# Patient Record
Sex: FEMALE | Race: AMERICAN INDIAN OR ALASKA NATIVE | NOT HISPANIC OR LATINO | Employment: FULL TIME | URBAN - NONMETROPOLITAN AREA
[De-identification: names, ages, dates, MRNs, and addresses within clinical notes are randomized per-mention and may not be internally consistent; named-entity substitution may affect disease eponyms.]

---

## 2017-01-05 ENCOUNTER — TRANSFERRED RECORDS (OUTPATIENT)
Dept: HEALTH INFORMATION MANAGEMENT | Facility: HOSPITAL | Age: 72
End: 2017-01-05

## 2017-01-11 ENCOUNTER — ANESTHESIA EVENT (OUTPATIENT)
Dept: SURGERY | Facility: HOSPITAL | Age: 72
End: 2017-01-11
Payer: COMMERCIAL

## 2017-01-11 ENCOUNTER — ANESTHESIA (OUTPATIENT)
Dept: SURGERY | Facility: HOSPITAL | Age: 72
End: 2017-01-11
Payer: COMMERCIAL

## 2017-01-11 PROCEDURE — 25000125 ZZHC RX 250: Performed by: NURSE ANESTHETIST, CERTIFIED REGISTERED

## 2017-01-11 PROCEDURE — 43239 EGD BIOPSY SINGLE/MULTIPLE: CPT | Performed by: ANESTHESIOLOGY

## 2017-01-11 PROCEDURE — 01999 UNLISTED ANES PROCEDURE: CPT | Performed by: NURSE ANESTHETIST, CERTIFIED REGISTERED

## 2017-01-11 RX ORDER — FENTANYL CITRATE 50 UG/ML
INJECTION, SOLUTION INTRAMUSCULAR; INTRAVENOUS PRN
Status: DISCONTINUED | OUTPATIENT
Start: 2017-01-11 | End: 2017-01-11

## 2017-01-11 RX ORDER — PROPOFOL 10 MG/ML
INJECTION, EMULSION INTRAVENOUS PRN
Status: DISCONTINUED | OUTPATIENT
Start: 2017-01-11 | End: 2017-01-11

## 2017-01-11 RX ADMIN — PROPOFOL 10 MG: 10 INJECTION, EMULSION INTRAVENOUS at 07:48

## 2017-01-11 RX ADMIN — PROPOFOL 20 MG: 10 INJECTION, EMULSION INTRAVENOUS at 07:38

## 2017-01-11 RX ADMIN — PROPOFOL 10 MG: 10 INJECTION, EMULSION INTRAVENOUS at 07:44

## 2017-01-11 RX ADMIN — PROPOFOL 10 MG: 10 INJECTION, EMULSION INTRAVENOUS at 07:46

## 2017-01-11 RX ADMIN — PROPOFOL 20 MG: 10 INJECTION, EMULSION INTRAVENOUS at 07:40

## 2017-01-11 RX ADMIN — PROPOFOL 20 MG: 10 INJECTION, EMULSION INTRAVENOUS at 07:35

## 2017-01-11 RX ADMIN — MIDAZOLAM HYDROCHLORIDE 1 MG: 1 INJECTION, SOLUTION INTRAMUSCULAR; INTRAVENOUS at 07:30

## 2017-01-11 RX ADMIN — PROPOFOL 10 MG: 10 INJECTION, EMULSION INTRAVENOUS at 07:42

## 2017-01-11 RX ADMIN — FENTANYL CITRATE 50 MCG: 50 INJECTION, SOLUTION INTRAMUSCULAR; INTRAVENOUS at 07:30

## 2017-01-11 ASSESSMENT — COPD QUESTIONNAIRES
COPD: 1
CAT_SEVERITY: MODERATE

## 2017-01-11 ASSESSMENT — ENCOUNTER SYMPTOMS: DYSRHYTHMIAS: 1

## 2017-01-11 ASSESSMENT — LIFESTYLE VARIABLES: TOBACCO_USE: 1

## 2017-01-11 NOTE — ANESTHESIA POSTPROCEDURE EVALUATION
Patient: Zoey Robledo    COMBINED ESOPHAGOSCOPY, GASTROSCOPY, DUODENOSCOPY (EGD) (N/A Esophagus)  Additional InformationProcedure(s):  UPPER ENDOSCOPY WITH BIOPSIES AND POLYPECTOMY - Wound Class: II-Clean Contaminated    Diagnosis:Melena  Diagnosis Additional Information: No value filed.    Anesthesia Type:  MAC    Note:  Anesthesia Post Evaluation    Patient location during evaluation: Phase 2 and Bedside  Patient participation: Able to fully participate in evaluation  Level of consciousness: awake and alert  Pain management: adequate  Airway patency: patent  Cardiovascular status: acceptable  Respiratory status: acceptable  Hydration status: stable  PONV: none     Anesthetic complications: None          Last vitals:  Filed Vitals:    01/11/17 0820 01/11/17 0825 01/11/17 0830   BP: 124/62 135/66 134/65   Temp:      Resp: 16  16   SpO2: 92% 93% 94%       Electronically Signed By: Cesar Moore MD  January 11, 2017  8:34 AM

## 2017-01-11 NOTE — ANESTHESIA PREPROCEDURE EVALUATION
Anesthesia Evaluation     . Pt has had prior anesthetic.     No history of anesthetic complications     ROS/MED HX    ENT/Pulmonary:     (+)tobacco use, Current use 1.0 PPD packs/day  moderate COPD, , . Other pulmonary disease Per recent CXR: 'pleural parenchymal scarring or fibrosis .    Neurologic:     (+)migraines,     Cardiovascular:     (+) Dyslipidemia, hypertension-range: not on beta blocker, ---. : . . JASSO, . :. dysrhythmias Irregular Heartbeat/Palpitations, . Previous cardiac testing date:results:date: results:ECG reviewed date:12/1/2016 results:NSR@77, OWN date: results:          METS/Exercise Tolerance:     Hematologic:  - neg hematologic  ROS       Musculoskeletal:   (+) arthritis, , , other musculoskeletal- Chronic LBP      GI/Hepatic:     (+) GERD       Renal/Genitourinary:  - ROS Renal section negative       Endo:     (+) type II DM .      Psychiatric:  - neg psychiatric ROS       Infectious Disease:  - neg infectious disease ROS       Malignancy:      - no malignancy   Other:    - neg other ROS           Physical Exam  Normal systems: cardiovascular and dental    Airway   Mallampati: III  TM distance: <3 FB  Neck ROM: full    Dental     Cardiovascular   Rhythm and rate: regular and normal      Pulmonary (+) wheezes     PE comment: End-Expiratory wheezing; unchanged from preop H&P                    Anesthesia Plan      History & Physical Review  History and physical reviewed and following examination; no interval change.    ASA Status:  3 .        Plan for MAC with Intravenous and Propofol induction. Maintenance will be TIVA.  Reason for MAC:  Chronic cardiopulmonary disease (G9) and Other - see comments  PONV prophylaxis:  Ondansetron (or other 5HT-3)  Surgeon requests deep sedation. Patient is an ASA 3. Will provide MAC.      Postoperative Care  Postoperative pain management:  IV analgesics.      Consents  Anesthetic plan, risks, benefits and alternatives discussed with:  Patient..                           .

## 2017-01-11 NOTE — ANESTHESIA CARE TRANSFER NOTE
Patient: Zoey Robledo    COMBINED ESOPHAGOSCOPY, GASTROSCOPY, DUODENOSCOPY (EGD) (N/A Esophagus)  Additional InformationProcedure(s):  UPPER ENDOSCOPY WITH BIOPSIES - Wound Class: II-Clean Contaminated    Diagnosis: Melena  Diagnosis Additional Information: No value filed.    Anesthesia Type:   MAC     Note:  Airway :Nasal Cannula  Patient transferred to:Phase II        Vitals: (Last set prior to Anesthesia Care Transfer)              Electronically Signed By: OCTAVIA Middleton CRNA  January 11, 2017  7:57 AM

## 2022-09-26 ENCOUNTER — MEDICAL CORRESPONDENCE (OUTPATIENT)
Dept: NUCLEAR MEDICINE | Facility: HOSPITAL | Age: 77
End: 2022-09-26

## 2022-10-31 ENCOUNTER — HOSPITAL ENCOUNTER (OUTPATIENT)
Dept: CARDIOLOGY | Facility: HOSPITAL | Age: 77
Setting detail: NUCLEAR MEDICINE
Discharge: HOME | End: 2022-10-31
Attending: FAMILY MEDICINE
Payer: COMMERCIAL

## 2022-10-31 ENCOUNTER — HOSPITAL ENCOUNTER (OUTPATIENT)
Dept: NUCLEAR MEDICINE | Facility: HOSPITAL | Age: 77
Setting detail: NUCLEAR MEDICINE
Discharge: HOME | End: 2022-10-31
Attending: FAMILY MEDICINE
Payer: COMMERCIAL

## 2022-10-31 DIAGNOSIS — I21.4 NSTEMI (NON-ST ELEVATED MYOCARDIAL INFARCTION) (H): ICD-10-CM

## 2022-10-31 LAB
CV BLOOD PRESSURE: 77 MMHG
CV STRESS MAX HR HE: 106
NUC STRESS EJECTION FRACTION: 65 %
RATE PRESSURE PRODUCT: NORMAL
STRESS ECHO BASELINE DIASTOLIC HE: 58
STRESS ECHO BASELINE HR: 73 BPM
STRESS ECHO BASELINE SYSTOLIC BP: 124
STRESS ECHO CALCULATED PERCENT HR: 73 %
STRESS ECHO LAST STRESS DIASTOLIC BP: 58
STRESS ECHO LAST STRESS SYSTOLIC BP: 122
STRESS ECHO TARGET HR: 145

## 2022-10-31 PROCEDURE — 93018 CV STRESS TEST I&R ONLY: CPT | Performed by: INTERNAL MEDICINE

## 2022-10-31 PROCEDURE — 93017 CV STRESS TEST TRACING ONLY: CPT

## 2022-10-31 PROCEDURE — 250N000011 HC RX IP 250 OP 636: Performed by: INTERNAL MEDICINE

## 2022-10-31 PROCEDURE — 78452 HT MUSCLE IMAGE SPECT MULT: CPT

## 2022-10-31 PROCEDURE — A9500 TC99M SESTAMIBI: HCPCS | Performed by: RADIOLOGY

## 2022-10-31 PROCEDURE — 343N000001 HC RX 343: Performed by: RADIOLOGY

## 2022-10-31 PROCEDURE — 93016 CV STRESS TEST SUPVJ ONLY: CPT | Performed by: INTERNAL MEDICINE

## 2022-10-31 RX ORDER — REGADENOSON 0.08 MG/ML
0.4 INJECTION, SOLUTION INTRAVENOUS ONCE
Status: COMPLETED | OUTPATIENT
Start: 2022-10-31 | End: 2022-10-31

## 2022-10-31 RX ORDER — AMINOPHYLLINE 25 MG/ML
INJECTION, SOLUTION INTRAVENOUS
Status: DISCONTINUED
Start: 2022-10-31 | End: 2022-10-31 | Stop reason: WASHOUT

## 2022-10-31 RX ADMIN — Medication 31.3 MILLICURIE: at 08:49

## 2022-10-31 RX ADMIN — REGADENOSON 0.4 MG: 0.08 INJECTION, SOLUTION INTRAVENOUS at 08:49

## 2022-10-31 RX ADMIN — Medication 9.5 MILLICURIE: at 06:47

## 2024-04-23 NOTE — PROGRESS NOTES
Otolaryngology Consultation    Patient: Zoey Robledo  : 1945    Patient presents with:  Ear Problem: Dysfunction of right eustachian tube- Geovani Walker CNP referring Ele Carver      HPI:  Zoey Robledo is a 78 year old female seen today for Eustachian tube dysfunction.  She also has a history of chronic recurrent sinusitis.    She had a month of right otalgia which recently resolved.  She saw Geovani Walker and was diagnosed with right eustachian tube dysfunction.  She was started on antihistamines and Flonase and supportive care recommended and referred to ENT.      She denies a history of chronic otitis media or recurrent acute otitis media.  No prior otologic surgery    She denies a history of otorrhea, vertigo or bothersome tinnitus.      History of migraine with aura.  She has an occasional aura followed by mild headache which self resolves.    She continues to have right aural fullness  Slow decline in hearing bilaterally.  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      Right otalgia seemed worse with head movement    Audiogram:  none in epic    She also notes a history of chronic recurrent sinusitis throughout her adult life.  She has 3-4 episodes of acute sinusitis a year associated with purulent rhinorrhea worsening cough and underlying chronic bilateral nasal obstruction.    SNOT 36 with moderate need to blow nose and postnasal drainage    No prior CT sinus      History of migraine, type 2 diabetes.    Pulm hx:  Followed by pulmonology.  2024 note with Dr. Yoder reviewed.  52-pack-year history of tobacco abuse quit in 2019 with severe COPD.  She has chronic chest congestion and sputum and has tried Mucinex in the past.  Recent decline in her PFT.  She was started on nasal saline by pulmonology.  She was to  an Acapella device to mobilize her secretions.  She takes Trelegy, albuterol and nebulizers, Singulair and Flonase    Former smoker, quit  1992    Sino-Nasal Outcome Test (SNOT - 22)    1. Need to Blow Nose: Moderate  2. Nasal Blockage: Mild or slight  3. Sneezing: Mild or slight  4. Runny Nose: Mild or slight  5. Cough: Mild or slight  6. Post-nasal discharge: Moderate  7. Thick nasal discharge: None  8. Ear fullness: Mild or slight  9. Dizziness: Very mild  10. Ear Pain: Mild or slight  11. Facial pain/pressure: Very mild  12. Decreased Sense of Smell/Taste: Very mild  13. Difficulty falling asleep: None  14. Wake up at night: Mild or slight  15. Lack of a good night's sleep: Moderate  16. Wake up tired: Mild or slight  17. Fatigue: Mild or slight  18. Reduced Productivity: Mild or slight  19. Reduced Concentration: Very mild  20. Frustrated/restless/irritable: Very mild  21. Sad: Very mild  22. Embarrassed: Very mild    Total Score: 36    COPYRIGHT 1996. [facility] [place] [facility],[place]        Current Outpatient Rx   Medication Sig Dispense Refill    acetaminophen (TYLENOL) 500 MG tablet Take 500-1,000 mg by mouth      albuterol (PROVENTIL HFA) 108 (90 BASE) MCG/ACT Inhaler Inhale 2 puffs into the lungs 4 times daily      amLODIPine (NORVASC) 5 MG tablet Take 1 tablet by mouth daily      amoxicillin-clavulanate (AUGMENTIN) 875-125 MG tablet       ASPIRIN PO Take 81 mg by mouth daily       atorvastatin (LIPITOR) 40 MG tablet TAKE ONE (1) TABLETS BY MOUTH WITH SUPPER.      blood glucose (TRUE METRIX BLOOD GLUCOSE TEST) test strip TEST BLOOD SUGARS EVERY DAY AND AS NEEDED      blood glucose monitoring (FREESTYLE) lancets       cefdinir (OMNICEF) 300 MG capsule       ferrous gluconate (FERGON) 324 (38 Fe) MG tablet TAKE ONE (1) TABLET BY MOUTH TWO TIMES A DAY.      isosorbide mononitrate (IMDUR) 30 MG 24 hr tablet       loratadine (CLARITIN) 10 MG tablet TAKE ONE (1) TABLET BY MOUTH ONE TIME A DAY. TAKE ON AN EMPTY STOMACH.      meloxicam (MOBIC) 7.5 MG tablet TAKE ONE (1) TABLETS BY MOUTH TWO TIMES A DAY. TAKE WITH FOOD.      metFORMIN  (GLUCOPHAGE) 500 MG tablet Take 500 mg by mouth 2 times daily (with meals)       montelukast (SINGULAIR) 10 MG tablet TAKE ONE (1) TABLETS BY MOUTH EVERY EVENING.      omeprazole (PRILOSEC) 20 MG CR capsule Take 20 mg by mouth daily       Vitamin D3 25 mcg (1000 units) tablet TAKE ONE (1) TABLET BY MOUTH ONE TIME A DAY.  VITAMIN D LAB CHECK AFTER 90 DAYS      Celecoxib (CELEBREX PO) Take 200 mg by mouth 2 times daily  (Patient not taking: Reported on 4/26/2024)      Cetirizine HCl (ZYRTEC PO) Take 10 mg by mouth daily  (Patient not taking: Reported on 4/26/2024)      doxycycline monohydrate (MONODOX) 100 MG capsule  (Patient not taking: Reported on 4/26/2024)      fluticasone-salmeterol (ADVAIR) 250-50 MCG/DOSE diskus inhaler Inhale 1 puff into the lungs every 12 hours (Patient not taking: Reported on 4/26/2024)      guaiFENesin (MUCINEX) 600 MG 12 hr tablet Take by mouth every 12 hours as needed for congestion Take 1 to 2 tablets (Patient not taking: Reported on 4/26/2024)      IPRATROPIUM-ALBUTEROL IN Inhale 1 puff into the lungs 4 times daily as needed For breathing.  Rinse mouth after inhaling. (Patient not taking: Reported on 4/26/2024)      levofloxacin (LEVAQUIN) 500 MG tablet  (Patient not taking: Reported on 4/26/2024)      lidocaine (LIDODERM) 5 % patch Place 1 patch onto the skin every 24 hours (Patient not taking: Reported on 4/26/2024)      LISINOPRIL PO Take 5 mg by mouth daily  (Patient not taking: Reported on 4/26/2024)      olopatadine (PATADAY) 0.2 % ophthalmic solution PLACE ONE (1) DROP INTO BOTH EYES ONE TIME A DAY. (Patient not taking: Reported on 4/26/2024)      pediatric electrolyte (PEDIALYTE) SOLN solution Take 355 mLs by mouth (Patient not taking: Reported on 4/26/2024)      phenylephrine HCl (SUDOGEST PE) 10 MG TABS Take 1 tablet by mouth Every 4 to 6 hours as needed for congestion. (Patient not taking: Reported on 4/26/2024)      predniSONE (DELTASONE) 20 MG tablet  (Patient not taking:  "Reported on 4/26/2024)      rosuvastatin (CRESTOR) 20 MG tablet Take 20 mg by mouth daily  (Patient not taking: Reported on 4/26/2024)      sodium chloride (NEBUSAL) 3 % neb solution Inhale 4 mLs into the lungs (Patient not taking: Reported on 4/26/2024)      sodium chloride (OCEAN) 0.65 % nasal spray Spray 2 sprays in nostril (Patient not taking: Reported on 4/26/2024)      Tiotropium Bromide Monohydrate (SPIRIVA HANDIHALER IN) Inhale 18 mcg into the lungs daily  (Patient not taking: Reported on 4/26/2024)      TRELEGY ELLIPTA 100-62.5-25 MCG/ACT oral inhaler INHALE ONE (1) PUFF INTO THE LUNGS ONE TIME A DAY. RINSE MOUTH AFTER USE (Patient not taking: Reported on 4/26/2024)         Allergies: Lisinopril, Contrast dye, Decadron [dexamethasone], Iodinated contrast media, and Red dye     No past medical history on file.    Past Surgical History:   Procedure Laterality Date    COLONOSCOPY N/A 6/16/2015    Procedure: COLONOSCOPY;  Surgeon: Brennan Au MD;  Location: [facility] OR    ESOPHAGOSCOPY, GASTROSCOPY, DUODENOSCOPY (EGD), COMBINED N/A 1/11/2017    Procedure: COMBINED ESOPHAGOSCOPY, GASTROSCOPY, DUODENOSCOPY (EGD);  Surgeon: Harley Parra DO;  Location: [facility] OR       ENT family history reviewed    Social History     Tobacco Use    Smoking status: Former     Current packs/day: 1.00     Types: Cigarettes    Smokeless tobacco: Former       Review of Systems  ROS: 10 point ROS neg other than the symptoms noted above in the HPI and blurred vision dry skin    Physical Exam  /68 (BP Location: Right arm, Patient Position: Sitting, Cuff Size: Adult Regular)   Pulse 87   Temp 97.6  F (36.4  C) (Temporal)   Resp 22   Ht 1.638 m (5' 4.49\")   Wt 65 kg (143 lb 4.8 oz)   SpO2 92%   BMI 24.23 kg/m    General - The patient is well nourished and well developed, and appears to have good nutritional status.  Alert and oriented to person and place, answers questions and cooperates with examination appropriately. "   Head and Face - Normocephalic and atraumatic, with no gross asymmetry noted.  The facial nerve is intact, with strong symmetric movements.  Voice and Breathing - The patient was breathing comfortably without the use of accessory muscles. There was no wheezing, stridor, or stertor.  The patients voice was clear and strong, and had appropriate pitch and quality.  No dee dee peripheral digital clubbing or cyanosis   Ears - examined under microscopy bilaterally  The external auditory canals are patent, the tympanic membranes are intact without effusion, retraction or mass.  Bony landmarks are intact.  Bender is midline Tara air conduction louder than bone conduction right  Eyes - Extraocular movements intact, and the pupils were reactive to light.  Sclera were not icteric or injected, conjunctiva were pink and moist.  Mouth - Examination of the oral cavity showed pink, healthy oral mucosa. No lesions or ulcerations noted.  The tongue was mobile and midline, and edentulous   throat - The walls of the oropharynx were smooth, pink, moist, symmetric, and had no lesions or ulcerations.  The tonsillar pillars and soft palate were symmetric.  The uvula was midline on elevation.  Grade 2 symmetric tonsils no mass  Neck - No palpable enlarged fixed cervical lymph nodes.  No neck cysts or unusual tenderness to palpation.   No palpable fixed thyroid nodules or concerning goiter.  The trachea is grossly midline.   Nose - External contour is symmetric, no gross deflection or scars.  Nasal mucosa is pink and moist with no abnormal mucus.  The septum and turbinates were evaluated.  No polyps, masses, or purulence noted on examination.    To evaluate the nose and sinuses, I performed rigid nasal endoscopy.  I applied topical nasal lidocaine and neosynephrine.    I began with the LEFT side using a 0 degree rigid nasal endoscope, and then similarly examined the RIGHT side    Findings:  Inferior turbinates:  4+ right, bhakti right, 3+  left  DNS left 90% obstruction  Middle turbinate and middle meatus:  No purulence, no polyposis, viscous secretions  Superior meatus was evaluated  Frontal recess clear  Mucosa is edematous throughout,  no dee dee polyps nor polypoid degeneration  Sphenoethmoidal recess without purulence   Nasopharynx clear, no mass, et patent  The patient tolerated the procedure well      Impression and Plan- Zoey Robledo is a 78 year old female with:    ICD-10-CM    1. Dysfunction of right eustachian tube  H69.91 lidocaine 2%-oxymetazoline 0.025% nasal solution 2 spray     Adult Audiology  Referral      2. Otalgia, right, resolved  H92.01       3. DNS (deviated nasal septum)  J34.2       4. Nasal turbinate hypertrophy  J34.3       5. History of tobacco abuse  Z87.891       6. Chronic pansinusitis  J32.4 CT Sinus w/o Contrast     budesonide (PULMICORT) 0.5 MG/2ML neb solution          Right otalgia has resolved.  I reassured her she had a normal ear exam  Audiogram pending    Start budesonide irrigations.  Continue flonase, mucinex, antihistamine use  CT sinus pending    Will plan follow-up accordingly    With a history of COPD, she may not be a candidate for general anesthesia.  Defer to Geovani Walker NP if clearance is needed based on CT.  Options of office nasal/sinus surgery discussed.            Cassidy Poole D.O.  Otolaryngology/Head and Neck Surgery  Allergy

## 2024-04-26 ENCOUNTER — OFFICE VISIT (OUTPATIENT)
Dept: OTOLARYNGOLOGY | Facility: OTHER | Age: 79
End: 2024-04-26
Attending: OTOLARYNGOLOGY
Payer: COMMERCIAL

## 2024-04-26 VITALS
DIASTOLIC BLOOD PRESSURE: 68 MMHG | OXYGEN SATURATION: 92 % | WEIGHT: 143.3 LBS | TEMPERATURE: 97.6 F | BODY MASS INDEX: 24.46 KG/M2 | RESPIRATION RATE: 22 BRPM | HEART RATE: 87 BPM | SYSTOLIC BLOOD PRESSURE: 108 MMHG | HEIGHT: 64 IN

## 2024-04-26 DIAGNOSIS — H92.01 OTALGIA, RIGHT: ICD-10-CM

## 2024-04-26 DIAGNOSIS — J32.4 CHRONIC PANSINUSITIS: ICD-10-CM

## 2024-04-26 DIAGNOSIS — H69.91 DYSFUNCTION OF RIGHT EUSTACHIAN TUBE: Primary | ICD-10-CM

## 2024-04-26 DIAGNOSIS — J34.3 NASAL TURBINATE HYPERTROPHY: ICD-10-CM

## 2024-04-26 DIAGNOSIS — J34.2 DNS (DEVIATED NASAL SEPTUM): ICD-10-CM

## 2024-04-26 DIAGNOSIS — Z87.891 HISTORY OF TOBACCO ABUSE: ICD-10-CM

## 2024-04-26 PROCEDURE — 31231 NASAL ENDOSCOPY DX: CPT | Performed by: OTOLARYNGOLOGY

## 2024-04-26 PROCEDURE — 92504 EAR MICROSCOPY EXAMINATION: CPT | Performed by: OTOLARYNGOLOGY

## 2024-04-26 PROCEDURE — 99204 OFFICE O/P NEW MOD 45 MIN: CPT | Mod: 25 | Performed by: OTOLARYNGOLOGY

## 2024-04-26 RX ORDER — MEDICAL SUPPLY, MISCELLANEOUS
355 EACH MISCELLANEOUS
COMMUNITY
Start: 2022-07-21

## 2024-04-26 RX ORDER — LORATADINE 10 MG/1
TABLET ORAL
COMMUNITY
Start: 2024-03-05

## 2024-04-26 RX ORDER — MELOXICAM 7.5 MG/1
TABLET ORAL
COMMUNITY
Start: 2024-04-05

## 2024-04-26 RX ORDER — CALCIUM CITRATE/VITAMIN D3 200MG-6.25
TABLET ORAL
COMMUNITY
Start: 2023-08-02

## 2024-04-26 RX ORDER — BUDESONIDE 0.5 MG/2ML
INHALANT ORAL
Qty: 200 ML | Refills: 11 | Status: SHIPPED | OUTPATIENT
Start: 2024-04-26

## 2024-04-26 RX ORDER — ATORVASTATIN CALCIUM 40 MG/1
TABLET, FILM COATED ORAL
COMMUNITY
Start: 2024-04-05

## 2024-04-26 RX ORDER — SODIUM CHLORIDE FOR INHALATION 3 %
4 VIAL, NEBULIZER (ML) INHALATION
COMMUNITY
Start: 2024-04-25

## 2024-04-26 RX ORDER — ECHINACEA PURPUREA EXTRACT 125 MG
2 TABLET ORAL
COMMUNITY
Start: 2022-09-28

## 2024-04-26 RX ORDER — LEVOFLOXACIN 500 MG/1
TABLET, FILM COATED ORAL
COMMUNITY
Start: 2023-11-27

## 2024-04-26 RX ORDER — PREDNISONE 20 MG/1
TABLET ORAL
COMMUNITY
Start: 2024-01-26

## 2024-04-26 RX ORDER — FLUTICASONE FUROATE, UMECLIDINIUM BROMIDE AND VILANTEROL TRIFENATATE 100; 62.5; 25 UG/1; UG/1; UG/1
POWDER RESPIRATORY (INHALATION)
COMMUNITY
Start: 2024-03-05

## 2024-04-26 RX ORDER — FERROUS GLUCONATE 324(38)MG
TABLET ORAL
COMMUNITY
Start: 2023-11-27

## 2024-04-26 RX ORDER — DOXYCYCLINE 100 MG/1
CAPSULE ORAL
COMMUNITY
Start: 2023-10-20

## 2024-04-26 RX ORDER — LANCETS 28 GAUGE
EACH MISCELLANEOUS
COMMUNITY
Start: 2024-03-05

## 2024-04-26 RX ORDER — VITAMIN B COMPLEX
TABLET ORAL
COMMUNITY
Start: 2024-04-05

## 2024-04-26 RX ORDER — ACETAMINOPHEN 500 MG
500-1000 TABLET ORAL
COMMUNITY
Start: 2024-04-05

## 2024-04-26 RX ORDER — ISOSORBIDE MONONITRATE 30 MG/1
TABLET, EXTENDED RELEASE ORAL
COMMUNITY
Start: 2024-04-05

## 2024-04-26 RX ORDER — OLOPATADINE HYDROCHLORIDE 2 MG/ML
SOLUTION/ DROPS OPHTHALMIC
COMMUNITY
Start: 2023-05-11

## 2024-04-26 RX ORDER — CEFDINIR 300 MG/1
CAPSULE ORAL
COMMUNITY
Start: 2023-10-20

## 2024-04-26 RX ORDER — AMLODIPINE BESYLATE 5 MG/1
1 TABLET ORAL DAILY
COMMUNITY
Start: 2023-11-27

## 2024-04-26 RX ORDER — MONTELUKAST SODIUM 10 MG/1
TABLET ORAL
COMMUNITY
Start: 2023-08-02

## 2024-04-26 RX ORDER — LIDOCAINE 50 MG/G
1 PATCH TOPICAL EVERY 24 HOURS
COMMUNITY
Start: 2022-10-26

## 2024-04-26 ASSESSMENT — PAIN SCALES - GENERAL: PAINLEVEL: NO PAIN (0)

## 2024-04-26 NOTE — LETTER
2024         RE: Zoey Robledo  [address]  [facility] [address]        Dear Colleague,    Thank you for referring your patient, Zoey Robledo, to the [facility]. Please see a copy of my visit note below.    Otolaryngology Consultation    Patient: Zoey Robledo  : 1945    Patient presents with:  Ear Problem: Dysfunction of right eustachian tube- Geovani Walker CNP referring Ele Carver      HPI:  Zoey Robledo is a 78 year old female seen today for Eustachian tube dysfunction.  She also has a history of chronic recurrent sinusitis.    She had a month of right otalgia which recently resolved.  She saw Geovani Walker and was diagnosed with right eustachian tube dysfunction.  She was started on antihistamines and Flonase and supportive care recommended and referred to ENT.      She denies a history of chronic otitis media or recurrent acute otitis media.  No prior otologic surgery    She denies a history of otorrhea, vertigo or bothersome tinnitus.      History of migraine with aura.  She has an occasional aura followed by mild headache which self resolves.    She continues to have right aural fullness  Slow decline in hearing bilaterally.  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      Right otalgia seemed worse with head movement    Audiogram:  none in epic    She also notes a history of chronic recurrent sinusitis throughout her adult life.  She has 3-4 episodes of acute sinusitis a year associated with purulent rhinorrhea worsening cough and underlying chronic bilateral nasal obstruction.    SNOT 36 with moderate need to blow nose and postnasal drainage    No prior CT sinus      History of migraine, type 2 diabetes.    Pulm hx:  Followed by pulmonology.  2024 note with Dr. Yoder reviewed.  52-pack-year history of tobacco abuse quit in 2019 with severe COPD.  She has chronic chest congestion and sputum and has tried Mucinex in the  past.  Recent decline in her PFT.  She was started on nasal saline by pulmonology.  She was to  an Acapella device to mobilize her secretions.  She takes Trelegy, albuterol and nebulizers, Singulair and Flonase    Former smoker, quit 1992    Sino-Nasal Outcome Test (SNOT - 22)    1. Need to Blow Nose: Moderate  2. Nasal Blockage: Mild or slight  3. Sneezing: Mild or slight  4. Runny Nose: Mild or slight  5. Cough: Mild or slight  6. Post-nasal discharge: Moderate  7. Thick nasal discharge: None  8. Ear fullness: Mild or slight  9. Dizziness: Very mild  10. Ear Pain: Mild or slight  11. Facial pain/pressure: Very mild  12. Decreased Sense of Smell/Taste: Very mild  13. Difficulty falling asleep: None  14. Wake up at night: Mild or slight  15. Lack of a good night's sleep: Moderate  16. Wake up tired: Mild or slight  17. Fatigue: Mild or slight  18. Reduced Productivity: Mild or slight  19. Reduced Concentration: Very mild  20. Frustrated/restless/irritable: Very mild  21. Sad: Very mild  22. Embarrassed: Very mild    Total Score: 36    COPYRIGHT 1996. [facility] [place] . [facility],[place]        Current Outpatient Rx   Medication Sig Dispense Refill     acetaminophen (TYLENOL) 500 MG tablet Take 500-1,000 mg by mouth       albuterol (PROVENTIL HFA) 108 (90 BASE) MCG/ACT Inhaler Inhale 2 puffs into the lungs 4 times daily       amLODIPine (NORVASC) 5 MG tablet Take 1 tablet by mouth daily       amoxicillin-clavulanate (AUGMENTIN) 875-125 MG tablet        ASPIRIN PO Take 81 mg by mouth daily        atorvastatin (LIPITOR) 40 MG tablet TAKE ONE (1) TABLETS BY MOUTH WITH SUPPER.       blood glucose (TRUE METRIX BLOOD GLUCOSE TEST) test strip TEST BLOOD SUGARS EVERY DAY AND AS NEEDED       blood glucose monitoring (FREESTYLE) lancets        cefdinir (OMNICEF) 300 MG capsule        ferrous gluconate (FERGON) 324 (38 Fe) MG tablet TAKE ONE (1) TABLET BY MOUTH TWO TIMES A DAY.       isosorbide mononitrate (IMDUR)  30 MG 24 hr tablet        loratadine (CLARITIN) 10 MG tablet TAKE ONE (1) TABLET BY MOUTH ONE TIME A DAY. TAKE ON AN EMPTY STOMACH.       meloxicam (MOBIC) 7.5 MG tablet TAKE ONE (1) TABLETS BY MOUTH TWO TIMES A DAY. TAKE WITH FOOD.       metFORMIN (GLUCOPHAGE) 500 MG tablet Take 500 mg by mouth 2 times daily (with meals)        montelukast (SINGULAIR) 10 MG tablet TAKE ONE (1) TABLETS BY MOUTH EVERY EVENING.       omeprazole (PRILOSEC) 20 MG CR capsule Take 20 mg by mouth daily        Vitamin D3 25 mcg (1000 units) tablet TAKE ONE (1) TABLET BY MOUTH ONE TIME A DAY.  VITAMIN D LAB CHECK AFTER 90 DAYS       Celecoxib (CELEBREX PO) Take 200 mg by mouth 2 times daily  (Patient not taking: Reported on 4/26/2024)       Cetirizine HCl (ZYRTEC PO) Take 10 mg by mouth daily  (Patient not taking: Reported on 4/26/2024)       doxycycline monohydrate (MONODOX) 100 MG capsule  (Patient not taking: Reported on 4/26/2024)       fluticasone-salmeterol (ADVAIR) 250-50 MCG/DOSE diskus inhaler Inhale 1 puff into the lungs every 12 hours (Patient not taking: Reported on 4/26/2024)       guaiFENesin (MUCINEX) 600 MG 12 hr tablet Take by mouth every 12 hours as needed for congestion Take 1 to 2 tablets (Patient not taking: Reported on 4/26/2024)       IPRATROPIUM-ALBUTEROL IN Inhale 1 puff into the lungs 4 times daily as needed For breathing.  Rinse mouth after inhaling. (Patient not taking: Reported on 4/26/2024)       levofloxacin (LEVAQUIN) 500 MG tablet  (Patient not taking: Reported on 4/26/2024)       lidocaine (LIDODERM) 5 % patch Place 1 patch onto the skin every 24 hours (Patient not taking: Reported on 4/26/2024)       LISINOPRIL PO Take 5 mg by mouth daily  (Patient not taking: Reported on 4/26/2024)       olopatadine (PATADAY) 0.2 % ophthalmic solution PLACE ONE (1) DROP INTO BOTH EYES ONE TIME A DAY. (Patient not taking: Reported on 4/26/2024)       pediatric electrolyte (PEDIALYTE) SOLN solution Take 355 mLs by mouth  (Patient not taking: Reported on 4/26/2024)       phenylephrine HCl (SUDOGEST PE) 10 MG TABS Take 1 tablet by mouth Every 4 to 6 hours as needed for congestion. (Patient not taking: Reported on 4/26/2024)       predniSONE (DELTASONE) 20 MG tablet  (Patient not taking: Reported on 4/26/2024)       rosuvastatin (CRESTOR) 20 MG tablet Take 20 mg by mouth daily  (Patient not taking: Reported on 4/26/2024)       sodium chloride (NEBUSAL) 3 % neb solution Inhale 4 mLs into the lungs (Patient not taking: Reported on 4/26/2024)       sodium chloride (OCEAN) 0.65 % nasal spray Spray 2 sprays in nostril (Patient not taking: Reported on 4/26/2024)       Tiotropium Bromide Monohydrate (SPIRIVA HANDIHALER IN) Inhale 18 mcg into the lungs daily  (Patient not taking: Reported on 4/26/2024)       TRELEGY ELLIPTA 100-62.5-25 MCG/ACT oral inhaler INHALE ONE (1) PUFF INTO THE LUNGS ONE TIME A DAY. RINSE MOUTH AFTER USE (Patient not taking: Reported on 4/26/2024)         Allergies: Lisinopril, Contrast dye, Decadron [dexamethasone], Iodinated contrast media, and Red dye     No past medical history on file.    Past Surgical History:   Procedure Laterality Date     COLONOSCOPY N/A 6/16/2015    Procedure: COLONOSCOPY;  Surgeon: Brennan Au MD;  Location: [facility] OR     ESOPHAGOSCOPY, GASTROSCOPY, DUODENOSCOPY (EGD), COMBINED N/A 1/11/2017    Procedure: COMBINED ESOPHAGOSCOPY, GASTROSCOPY, DUODENOSCOPY (EGD);  Surgeon: Harley Parra DO;  Location: [facility] OR       ENT family history reviewed    Social History     Tobacco Use     Smoking status: Former     Current packs/day: 1.00     Types: Cigarettes     Smokeless tobacco: Former       Review of Systems  ROS: 10 point ROS neg other than the symptoms noted above in the HPI and blurred vision dry skin    Physical Exam  /68 (BP Location: Right arm, Patient Position: Sitting, Cuff Size: Adult Regular)   Pulse 87   Temp 97.6  F (36.4  C) (Temporal)   Resp 22   Ht 1.638 m (5'  "4.49\")   Wt 65 kg (143 lb 4.8 oz)   SpO2 92%   BMI 24.23 kg/m    General - The patient is well nourished and well developed, and appears to have good nutritional status.  Alert and oriented to person and place, answers questions and cooperates with examination appropriately.   Head and Face - Normocephalic and atraumatic, with no gross asymmetry noted.  The facial nerve is intact, with strong symmetric movements.  Voice and Breathing - The patient was breathing comfortably without the use of accessory muscles. There was no wheezing, stridor, or stertor.  The patients voice was clear and strong, and had appropriate pitch and quality.  No dee dee peripheral digital clubbing or cyanosis   Ears - examined under microscopy bilaterally  The external auditory canals are patent, the tympanic membranes are intact without effusion, retraction or mass.  Bony landmarks are intact.  Bender is midline Tara air conduction louder than bone conduction right  Eyes - Extraocular movements intact, and the pupils were reactive to light.  Sclera were not icteric or injected, conjunctiva were pink and moist.  Mouth - Examination of the oral cavity showed pink, healthy oral mucosa. No lesions or ulcerations noted.  The tongue was mobile and midline, and edentulous   throat - The walls of the oropharynx were smooth, pink, moist, symmetric, and had no lesions or ulcerations.  The tonsillar pillars and soft palate were symmetric.  The uvula was midline on elevation.  Grade 2 symmetric tonsils no mass  Neck - No palpable enlarged fixed cervical lymph nodes.  No neck cysts or unusual tenderness to palpation.   No palpable fixed thyroid nodules or concerning goiter.  The trachea is grossly midline.   Nose - External contour is symmetric, no gross deflection or scars.  Nasal mucosa is pink and moist with no abnormal mucus.  The septum and turbinates were evaluated.  No polyps, masses, or purulence noted on examination.    To evaluate the nose " and sinuses, I performed rigid nasal endoscopy.  I applied topical nasal lidocaine and neosynephrine.    I began with the LEFT side using a 0 degree rigid nasal endoscope, and then similarly examined the RIGHT side    Findings:  Inferior turbinates:  4+ right, bhakti right, 3+ left  DNS left 90% obstruction  Middle turbinate and middle meatus:  No purulence, no polyposis, viscous secretions  Superior meatus was evaluated  Frontal recess clear  Mucosa is edematous throughout,  no dee dee polyps nor polypoid degeneration  Sphenoethmoidal recess without purulence   Nasopharynx clear, no mass, et patent  The patient tolerated the procedure well      Impression and Plan- Zoey Robledo is a 78 year old female with:    ICD-10-CM    1. Dysfunction of right eustachian tube  H69.91 lidocaine 2%-oxymetazoline 0.025% nasal solution 2 spray     Adult Audiology  Referral      2. Otalgia, right, resolved  H92.01       3. DNS (deviated nasal septum)  J34.2       4. Nasal turbinate hypertrophy  J34.3       5. History of tobacco abuse  Z87.891       6. Chronic pansinusitis  J32.4 CT Sinus w/o Contrast     budesonide (PULMICORT) 0.5 MG/2ML neb solution          Right otalgia has resolved.  I reassured her she had a normal ear exam  Audiogram pending    Start budesonide irrigations.  Continue flonase, mucinex, antihistamine use  CT sinus pending    Will plan follow-up accordingly    With a history of COPD, she may not be a candidate for general anesthesia.  Defer to Geovani Walker NP if clearance is needed based on CT.  Options of office nasal/sinus surgery discussed.            Cassidy Poole D.O.  Otolaryngology/Head and Neck Surgery  Allergy        Again, thank you for allowing me to participate in the care of your patient.        Sincerely,        Cassidy Poole MD

## 2024-04-26 NOTE — PATIENT INSTRUCTIONS
Thank you for allowing Dr. Poole and our ENT team to participate in your care.  If your medications are too expensive, please give the nurse a call.  We can possibly change this medication.  If you have a scheduling or an appointment question please contact our Health Unit Coordinator at their direct line [phone].   ALL nursing questions or concerns can be directed to your ENT nurse, Damien, at: [phone]    Complete Audiogram  CT Sinus in [place]. We will call with results and follow-up will be based on results.     No fluid in the ear, no ear infection.     Start Budesonide irrigations      Budesonide nasal saline irrigation per instructions:  -Obtain Bull Med Sinus rinse over the counter.    -Use warm distilled water and 2 packets of the salt solution that comes with the bottle, dissolve in bottle up to the 240 mL blayne.  -Add 1 vial of budesonide.  -Irrigate each side of your nose leaning over the sink, using 1/3 to 1/2 the volume of the bottle in each nostril every irrigation.  Irrigate 2 times daily.  -If additional rinses are needed/recommended, you may use the plan Bull Med Sinus irrigation without the use of added budesonide